# Patient Record
Sex: FEMALE | Race: BLACK OR AFRICAN AMERICAN | NOT HISPANIC OR LATINO | Employment: OTHER | URBAN - METROPOLITAN AREA
[De-identification: names, ages, dates, MRNs, and addresses within clinical notes are randomized per-mention and may not be internally consistent; named-entity substitution may affect disease eponyms.]

---

## 2017-05-12 DIAGNOSIS — E55.9 VITAMIN D DEFICIENCY: ICD-10-CM

## 2017-05-12 RX ORDER — ASPIRIN 325 MG
TABLET, DELAYED RELEASE (ENTERIC COATED) ORAL
Qty: 4 CAPSULE | Refills: 6 | Status: SHIPPED | OUTPATIENT
Start: 2017-05-12

## 2017-06-15 ENCOUNTER — TELEPHONE (OUTPATIENT)
Dept: RHEUMATOLOGY | Facility: CLINIC | Age: 82
End: 2017-06-15

## 2017-06-15 NOTE — TELEPHONE ENCOUNTER
----- Message from Luis Eduardo Junior sent at 6/15/2017  9:32 AM CDT -----  Contact: PT   States she is calling rg wanting to knw when she is due for a follow up with  and can be reached at [phone]//thanks/dbw   States if no answer pls leave it on the VM

## 2017-06-15 NOTE — TELEPHONE ENCOUNTER
Patient needing a call back regarding Reclast follow up with BMP and Natacha Baumann PA-C. Please advise.

## 2017-06-16 ENCOUNTER — TELEPHONE (OUTPATIENT)
Dept: RHEUMATOLOGY | Facility: HOSPITAL | Age: 82
End: 2017-06-16

## 2017-06-16 DIAGNOSIS — E55.9 VITAMIN D DEFICIENCY: Primary | ICD-10-CM

## 2017-06-16 DIAGNOSIS — E55.9 VITAMIN D DEFICIENCY: ICD-10-CM

## 2017-06-16 DIAGNOSIS — M81.0 AGE-RELATED OSTEOPOROSIS WITHOUT CURRENT PATHOLOGICAL FRACTURE: Primary | ICD-10-CM

## 2017-06-16 DIAGNOSIS — M81.0 AGE-RELATED OSTEOPOROSIS WITHOUT CURRENT PATHOLOGICAL FRACTURE: ICD-10-CM

## 2017-06-16 RX ORDER — ZOLEDRONIC ACID 5 MG/100ML
5 INJECTION, SOLUTION INTRAVENOUS
Status: CANCELLED | OUTPATIENT
Start: 2017-06-29

## 2017-06-16 RX ORDER — SODIUM CHLORIDE 0.9 % (FLUSH) 0.9 %
10 SYRINGE (ML) INJECTION
Status: CANCELLED | OUTPATIENT
Start: 2017-06-29

## 2017-06-16 NOTE — TELEPHONE ENCOUNTER
Please enter new reclast therapy plan so med auth can be released  Dr Loaiza last saw 5/2016  Also please put in appro lab orders stat  brit 6/29 with you   TY

## 2017-06-29 ENCOUNTER — INFUSION (OUTPATIENT)
Dept: RHEUMATOLOGY | Facility: HOSPITAL | Age: 82
End: 2017-06-29
Attending: INTERNAL MEDICINE
Payer: MEDICARE

## 2017-06-29 ENCOUNTER — LAB VISIT (OUTPATIENT)
Dept: LAB | Facility: HOSPITAL | Age: 82
End: 2017-06-29
Attending: INTERNAL MEDICINE
Payer: MEDICARE

## 2017-06-29 ENCOUNTER — TELEPHONE (OUTPATIENT)
Dept: RHEUMATOLOGY | Facility: CLINIC | Age: 82
End: 2017-06-29

## 2017-06-29 ENCOUNTER — OFFICE VISIT (OUTPATIENT)
Dept: RHEUMATOLOGY | Facility: CLINIC | Age: 82
End: 2017-06-29
Attending: INTERNAL MEDICINE
Payer: MEDICARE

## 2017-06-29 VITALS
WEIGHT: 225.31 LBS | BODY MASS INDEX: 35.29 KG/M2 | SYSTOLIC BLOOD PRESSURE: 101 MMHG | RESPIRATION RATE: 18 BRPM | DIASTOLIC BLOOD PRESSURE: 56 MMHG | HEART RATE: 67 BPM

## 2017-06-29 VITALS
DIASTOLIC BLOOD PRESSURE: 65 MMHG | SYSTOLIC BLOOD PRESSURE: 138 MMHG | HEART RATE: 93 BPM | BODY MASS INDEX: 35.29 KG/M2 | WEIGHT: 225.31 LBS

## 2017-06-29 DIAGNOSIS — M81.0 AGE-RELATED OSTEOPOROSIS WITHOUT CURRENT PATHOLOGICAL FRACTURE: Primary | ICD-10-CM

## 2017-06-29 DIAGNOSIS — M17.0 PRIMARY OSTEOARTHRITIS OF BOTH KNEES: Chronic | ICD-10-CM

## 2017-06-29 DIAGNOSIS — M81.0 AGE-RELATED OSTEOPOROSIS WITHOUT CURRENT PATHOLOGICAL FRACTURE: ICD-10-CM

## 2017-06-29 DIAGNOSIS — M85.89 OSTEOPENIA OF MULTIPLE SITES: Primary | ICD-10-CM

## 2017-06-29 DIAGNOSIS — E55.9 VITAMIN D DEFICIENCY: ICD-10-CM

## 2017-06-29 DIAGNOSIS — M85.89 OSTEOPENIA OF MULTIPLE SITES: ICD-10-CM

## 2017-06-29 LAB
25(OH)D3+25(OH)D2 SERPL-MCNC: 71 NG/ML
ANION GAP SERPL CALC-SCNC: 14 MMOL/L
BUN SERPL-MCNC: 29 MG/DL
CALCIUM SERPL-MCNC: 10.3 MG/DL
CHLORIDE SERPL-SCNC: 100 MMOL/L
CO2 SERPL-SCNC: 28 MMOL/L
CREAT SERPL-MCNC: 1.5 MG/DL
EST. GFR  (AFRICAN AMERICAN): 37 ML/MIN/1.73 M^2
EST. GFR  (NON AFRICAN AMERICAN): 32 ML/MIN/1.73 M^2
GLUCOSE SERPL-MCNC: 111 MG/DL
POTASSIUM SERPL-SCNC: 3.5 MMOL/L
SODIUM SERPL-SCNC: 142 MMOL/L

## 2017-06-29 PROCEDURE — 25000003 PHARM REV CODE 250: Mod: PO | Performed by: PHYSICIAN ASSISTANT

## 2017-06-29 PROCEDURE — 63600175 PHARM REV CODE 636 W HCPCS: Mod: PO | Performed by: PHYSICIAN ASSISTANT

## 2017-06-29 PROCEDURE — 99213 OFFICE O/P EST LOW 20 MIN: CPT | Mod: PBBFAC,PO | Performed by: PHYSICIAN ASSISTANT

## 2017-06-29 PROCEDURE — 36415 COLL VENOUS BLD VENIPUNCTURE: CPT | Mod: PO

## 2017-06-29 PROCEDURE — 96360 HYDRATION IV INFUSION INIT: CPT | Mod: PO,59

## 2017-06-29 PROCEDURE — 99214 OFFICE O/P EST MOD 30 MIN: CPT | Mod: S$PBB,,, | Performed by: PHYSICIAN ASSISTANT

## 2017-06-29 PROCEDURE — 1159F MED LIST DOCD IN RCRD: CPT | Mod: ,,, | Performed by: PHYSICIAN ASSISTANT

## 2017-06-29 PROCEDURE — 82306 VITAMIN D 25 HYDROXY: CPT

## 2017-06-29 PROCEDURE — 80048 BASIC METABOLIC PNL TOTAL CA: CPT | Mod: PO

## 2017-06-29 PROCEDURE — 99999 PR PBB SHADOW E&M-EST. PATIENT-LVL III: CPT | Mod: PBBFAC,,, | Performed by: PHYSICIAN ASSISTANT

## 2017-06-29 PROCEDURE — 96365 THER/PROPH/DIAG IV INF INIT: CPT | Mod: PO

## 2017-06-29 PROCEDURE — 1125F AMNT PAIN NOTED PAIN PRSNT: CPT | Mod: ,,, | Performed by: PHYSICIAN ASSISTANT

## 2017-06-29 RX ORDER — SODIUM CHLORIDE 9 MG/ML
INJECTION, SOLUTION INTRAVENOUS CONTINUOUS
Status: CANCELLED
Start: 2017-06-29

## 2017-06-29 RX ORDER — SODIUM CHLORIDE 9 MG/ML
INJECTION, SOLUTION INTRAVENOUS CONTINUOUS
Status: DISCONTINUED | OUTPATIENT
Start: 2017-06-29 | End: 2017-06-29 | Stop reason: HOSPADM

## 2017-06-29 RX ORDER — ZOLEDRONIC ACID 5 MG/100ML
5 INJECTION, SOLUTION INTRAVENOUS
Status: CANCELLED | OUTPATIENT
Start: 2017-06-29

## 2017-06-29 RX ORDER — SODIUM CHLORIDE 0.9 % (FLUSH) 0.9 %
10 SYRINGE (ML) INJECTION
Status: CANCELLED | OUTPATIENT
Start: 2017-06-29

## 2017-06-29 RX ORDER — SPIRONOLACTONE 25 MG/1
25 TABLET ORAL DAILY
COMMUNITY

## 2017-06-29 RX ORDER — SODIUM CHLORIDE 0.9 % (FLUSH) 0.9 %
10 SYRINGE (ML) INJECTION
Status: DISCONTINUED | OUTPATIENT
Start: 2017-06-29 | End: 2017-06-29 | Stop reason: HOSPADM

## 2017-06-29 RX ORDER — ZOLEDRONIC ACID 5 MG/100ML
5 INJECTION, SOLUTION INTRAVENOUS
Status: COMPLETED | OUTPATIENT
Start: 2017-06-29 | End: 2017-06-29

## 2017-06-29 RX ORDER — IBUPROFEN 200 MG
1 CAPSULE ORAL DAILY
COMMUNITY
End: 2017-12-29 | Stop reason: SDUPTHER

## 2017-06-29 RX ADMIN — Medication 10 ML: at 08:06

## 2017-06-29 RX ADMIN — SODIUM CHLORIDE: 9 INJECTION, SOLUTION INTRAVENOUS at 08:06

## 2017-06-29 RX ADMIN — ZOLEDRONIC ACID 5 MG: 5 INJECTION, SOLUTION INTRAVENOUS at 09:06

## 2017-06-29 NOTE — PATIENT INSTRUCTIONS
Zoledronic Acid injection (Paget's Disease, Osteoporosis)  What is this medicine?  ZOLEDRONIC ACID (PABLO le dron ik AS id) lowers the amount of calcium loss from bone. It is used to treat Paget's disease and osteoporosis in women.  How should I use this medicine?  This medicine is for infusion into a vein. It is given by a health care professional in a hospital or clinic setting.  Talk to your pediatrician regarding the use of this medicine in children. This medicine is not approved for use in children.  What side effects may I notice from receiving this medicine?  Side effects that you should report to your doctor or health care professional as soon as possible:  · allergic reactions like skin rash, itching or hives, swelling of the face, lips, or tongue  · anxiety, confusion, or depression  · breathing problems  · changes in vision  · eye pain  · feeling faint or lightheaded, falls  · jaw pain, especially after dental work  · mouth sores  · muscle cramps, stiffness, or weakness  · redness, blistering, peeling or loosening of the skin, including inside the mouth  · trouble passing urine or change in the amount of urine  Side effects that usually do not require medical attention (report to your doctor or health care professional if they continue or are bothersome):  · bone, joint, or muscle pain  · constipation  · diarrhea  · fever  · hair loss  · irritation at site where injected  · loss of appetite  · nausea, vomiting  · stomach upset  · trouble sleeping  · trouble swallowing  · weak or tired  What may interact with this medicine?  · certain antibiotics given by injection  · NSAIDs, medicines for pain and inflammation, like ibuprofen or naproxen  · some diuretics like bumetanide, furosemide  · teriparatide  What if I miss a dose?  It is important not to miss your dose. Call your doctor or health care professional if you are unable to keep an appointment.  Where should I keep my medicine?  This drug is given in a  hospital or clinic and will not be stored at home.  What should I tell my health care provider before I take this medicine?  They need to know if you have any of these conditions:  · aspirin-sensitive asthma  · cancer, especially if you are receiving medicines used to treat cancer  · dental disease or wear dentures  · infection  · kidney disease  · low levels of calcium in the blood  · past surgery on the parathyroid gland or intestines  · receiving corticosteroids like dexamethasone or prednisone  · an unusual or allergic reaction to zoledronic acid, other medicines, foods, dyes, or preservatives  · pregnant or trying to get pregnant  · breast-feeding  What should I watch for while using this medicine?  Visit your doctor or health care professional for regular checkups. It may be some time before you see the benefit from this medicine. Do not stop taking your medicine unless your doctor tells you to. Your doctor may order blood tests or other tests to see how you are doing.  Women should inform their doctor if they wish to become pregnant or think they might be pregnant. There is a potential for serious side effects to an unborn child. Talk to your health care professional or pharmacist for more information.  You should make sure that you get enough calcium and vitamin D while you are taking this medicine. Discuss the foods you eat and the vitamins you take with your health care professional.  Some people who take this medicine have severe bone, joint, and/or muscle pain. This medicine may also increase your risk for jaw problems or a broken thigh bone. Tell your doctor right away if you have severe pain in your jaw, bones, joints, or muscles. Tell your doctor if you have any pain that does not go away or that gets worse.  Tell your dentist and dental surgeon that you are taking this medicine. You should not have major dental surgery while on this medicine. See your dentist to have a dental exam and fix any dental  problems before starting this medicine. Take good care of your teeth while on this medicine. Make sure you see your dentist for regular follow-up appointments.  Date Last Reviewed:   NOTE:This sheet is a summary. It may not cover all possible information. If you have questions about this medicine, talk to your doctor, pharmacist, or health care provider. Copyright© 2016 [employer]

## 2017-06-29 NOTE — PATIENT INSTRUCTIONS
Recent Results (from the past 168 hour(s))   Basic metabolic panel    Collection Time: 06/29/17  7:20 AM   Result Value Ref Range    Sodium 142 136 - 145 mmol/L    Potassium 3.5 3.5 - 5.1 mmol/L    Chloride 100 95 - 110 mmol/L    CO2 28 23 - 29 mmol/L    Glucose 111 (H) 70 - 110 mg/dL    BUN, Bld 29 (H) 8 - 23 mg/dL    Creatinine 1.5 (H) 0.5 - 1.4 mg/dL    Calcium 10.3 8.7 - 10.5 mg/dL    Anion Gap 14 8 - 16 mmol/L    eGFR if African American 37 (A) >60 mL/min/1.73 m^2    eGFR if non African American 32 (A) >60 mL/min/1.73 m^2     reclast today, may be moving to another drug next June/july (PROLIA)  Drink lots of water  See your primary care md about your kidneys, may need to adjust some medications  Hold your diuretics today

## 2017-06-29 NOTE — PROGRESS NOTES
Infusion # 2  Recent Labs? 6/29/17-Creatinine-1.5. Pt cleared for infusion per FRANCIE Manzano.     Recent Invasive or planned dental procedures? Denied    100 ml NS administered over 10 minutes per FRANCIE Manzano.   Reclast 5mg administered IV over 30 minutes.     Pt tolerated well without adverse events. Pressure dressing applied.  See Vitals and MAR for further details.  Pt discharged ambulatory from clinic.

## 2017-06-29 NOTE — PROGRESS NOTES
Subjective:       Patient ID: Shweta Welch is a 82 y.o. female.    Chief Complaint: Osteoporosis    Shweta is here for follow up for osteopenia with high fracture risk and osteoarthritis.  She is here today for reclast #2. Tolerated her first infusion well. She has low vitamin D and takes 50K units weekly.  NO falls or fxs since last visit, she walks with a cane.   She has severe arthritis in both knees and has had her right knee replaced.  Her left knee has been injected with cortisone in the past but this didn't really help.  She has severe tricompartmental damage on xray. She also c/o low back pain with occasional numbness/tingling in her feet.  SHe is unable to take nsaids due to kidney insufficiency.  She uses tylenol prn.       Review of Systems   Constitutional: Negative for chills, fatigue and fever.   HENT: Negative for mouth sores, rhinorrhea and sore throat.    Eyes: Negative for pain and redness.   Respiratory: Negative for cough and shortness of breath.    Cardiovascular: Negative for chest pain.        Heart failure  htn   Gastrointestinal: Negative for abdominal pain, constipation, diarrhea, nausea and vomiting.   Genitourinary: Negative for dysuria and hematuria.   Musculoskeletal: Positive for arthralgias, back pain and gait problem. Negative for joint swelling and myalgias.   Skin: Negative for rash.   Neurological: Negative for weakness, numbness and headaches.   Psychiatric/Behavioral: The patient is not nervous/anxious.          Objective:   /65   Pulse 93   Wt 102.2 kg (225 lb 5 oz)   BMI 35.29 kg/m²      Physical Exam   Constitutional: She is oriented to person, place, and time and well-developed, well-nourished, and in no distress.   HENT:   Head: Normocephalic and atraumatic.   Eyes: Pupils are equal, round, and reactive to light. Right eye exhibits no discharge.   Neck: Normal range of motion.   Cardiovascular: Normal rate, regular rhythm and normal heart sounds.  Exam reveals  no friction rub.    Pulmonary/Chest: Effort normal and breath sounds normal. No respiratory distress.   Abdominal: Soft. She exhibits no distension. There is no tenderness.   Lymphadenopathy:     She has no cervical adenopathy.   Neurological: She is alert and oriented to person, place, and time.   Skin: No rash noted. No erythema.     Psychiatric: Mood normal.   Musculoskeletal: Normal range of motion. She exhibits no edema or deformity.   Left knee with crepitus, significant, no effusion, no warmth, decreased rom.  Right knee replaced.  She walks with a cane and needs to push up when getting out of her chair               Recent Results (from the past 168 hour(s))   Basic metabolic panel    Collection Time: 06/29/17  7:20 AM   Result Value Ref Range    Sodium 142 136 - 145 mmol/L    Potassium 3.5 3.5 - 5.1 mmol/L    Chloride 100 95 - 110 mmol/L    CO2 28 23 - 29 mmol/L    Glucose 111 (H) 70 - 110 mg/dL    BUN, Bld 29 (H) 8 - 23 mg/dL    Creatinine 1.5 (H) 0.5 - 1.4 mg/dL    Calcium 10.3 8.7 - 10.5 mg/dL    Anion Gap 14 8 - 16 mmol/L    eGFR if African American 37 (A) >60 mL/min/1.73 m^2    eGFR if non African American 32 (A) >60 mL/min/1.73 m^2          Dexa 5.2016: DF -0.8, left neck -2.4, spine -1.5, frax major 34.2, hip 11.7 decreasing bmd at hip  Assessment:       1. Osteopenia of multiple sites    2. Primary osteoarthritis of both knees    3. Vitamin D deficiency        Osteopenia with high fracture risk: Reclast  #2 due today,Dexa 5.2016: DF -0.8, left neck -2.4, spine -1.5, frax major 34.2, hip 11.7 decreasing bmd at hip; no falls/fxs  Vitamin D deficiency: on 50K units weekly   Bilateral knee osteoarthritis: right knee replaced; severe left knee djd cortisone don't work very well, avoids nsaids, ultimately needs knee replacement  CKD: GFR 37, could be worsened from htn meds  Plan:       Will give reclast today but if GFR gets below 35 will not continue, will discuss with Dr. Loaiza, may be better to move  to [place] next June when due for reclast #3  Will give 100ccs of Normal saline and give reclast over 30 mins  F/u with pcp about kidney function, may need nephrology referral   Cont vit D 50,000units weekly   Hold lasix today, hydrate well today     rtc in 6 mos, will check kidney function and discuss continuing reclast vs moving to [place]

## 2017-06-29 NOTE — PLAN OF CARE
Problem: Fall Risk (Adult)  Goal: Absence of Falls  Patient will demonstrate the desired outcomes by discharge/transition of care.  Outcome: Ongoing (interventions implemented as appropriate)  Reviewed fall precautions with pt. Verbalizes understanding.

## 2017-07-24 ENCOUNTER — TELEPHONE (OUTPATIENT)
Dept: RHEUMATOLOGY | Facility: CLINIC | Age: 82
End: 2017-07-24

## 2017-07-24 DIAGNOSIS — M85.89 OSTEOPENIA OF MULTIPLE SITES: ICD-10-CM

## 2017-07-24 DIAGNOSIS — M17.0 PRIMARY OSTEOARTHRITIS OF BOTH KNEES: Primary | Chronic | ICD-10-CM

## 2017-07-24 NOTE — TELEPHONE ENCOUNTER
----- Message from Cammy De Luna sent at 7/24/2017  2:11 PM CDT -----  Contact: pt  She's calling to see if she should come in due to a fall, please advise [phone] (home)

## 2017-07-24 NOTE — TELEPHONE ENCOUNTER
Returned pt call pt stated on her last visit when she got home she tripped and fell on both knees. She stating she walks ok, but her knees are still sensitive and hurts from time to time. She states it feels like where she had surgery on her knee (2001)? That something feels out of place. Pt wants to know shuld she get xrays?,make an appt? Etc.... Please advise thanks

## 2017-07-26 ENCOUNTER — HOSPITAL ENCOUNTER (OUTPATIENT)
Dept: RADIOLOGY | Facility: HOSPITAL | Age: 82
Discharge: HOME | End: 2017-07-26
Attending: INTERNAL MEDICINE
Payer: MEDICARE

## 2017-07-26 DIAGNOSIS — M85.89 OSTEOPENIA OF MULTIPLE SITES: ICD-10-CM

## 2017-07-26 DIAGNOSIS — M17.0 PRIMARY OSTEOARTHRITIS OF BOTH KNEES: Chronic | ICD-10-CM

## 2017-07-26 PROCEDURE — 73562 X-RAY EXAM OF KNEE 3: CPT | Mod: TC,50,PO

## 2017-07-26 PROCEDURE — 73562 X-RAY EXAM OF KNEE 3: CPT | Mod: 26,50,, | Performed by: RADIOLOGY

## 2017-08-03 ENCOUNTER — OFFICE VISIT (OUTPATIENT)
Dept: RHEUMATOLOGY | Facility: CLINIC | Age: 82
End: 2017-08-03
Payer: MEDICARE

## 2017-08-03 VITALS
HEART RATE: 97 BPM | SYSTOLIC BLOOD PRESSURE: 132 MMHG | WEIGHT: 222.88 LBS | DIASTOLIC BLOOD PRESSURE: 64 MMHG | BODY MASS INDEX: 34.91 KG/M2

## 2017-08-03 DIAGNOSIS — M25.562 ACUTE PAIN OF BOTH KNEES: Primary | ICD-10-CM

## 2017-08-03 DIAGNOSIS — M25.561 ACUTE PAIN OF BOTH KNEES: Primary | ICD-10-CM

## 2017-08-03 DIAGNOSIS — M17.0 PRIMARY OSTEOARTHRITIS OF BOTH KNEES: Chronic | ICD-10-CM

## 2017-08-03 DIAGNOSIS — M47.816 SPONDYLOSIS OF LUMBAR REGION WITHOUT MYELOPATHY OR RADICULOPATHY: Chronic | ICD-10-CM

## 2017-08-03 DIAGNOSIS — M85.89 OSTEOPENIA OF MULTIPLE SITES: ICD-10-CM

## 2017-08-03 PROCEDURE — 99214 OFFICE O/P EST MOD 30 MIN: CPT | Mod: S$PBB,,, | Performed by: PHYSICIAN ASSISTANT

## 2017-08-03 PROCEDURE — 1125F AMNT PAIN NOTED PAIN PRSNT: CPT | Mod: ,,, | Performed by: PHYSICIAN ASSISTANT

## 2017-08-03 PROCEDURE — 1159F MED LIST DOCD IN RCRD: CPT | Mod: ,,, | Performed by: PHYSICIAN ASSISTANT

## 2017-08-03 PROCEDURE — 3008F BODY MASS INDEX DOCD: CPT | Mod: ,,, | Performed by: PHYSICIAN ASSISTANT

## 2017-08-03 PROCEDURE — 99213 OFFICE O/P EST LOW 20 MIN: CPT | Mod: PBBFAC,PO | Performed by: PHYSICIAN ASSISTANT

## 2017-08-03 PROCEDURE — 99999 PR PBB SHADOW E&M-EST. PATIENT-LVL III: CPT | Mod: PBBFAC,,, | Performed by: PHYSICIAN ASSISTANT

## 2017-08-03 RX ORDER — GABAPENTIN 300 MG/1
300 CAPSULE ORAL 3 TIMES DAILY
Qty: 90 CAPSULE | Refills: 11 | Status: SHIPPED | OUTPATIENT
Start: 2017-08-03 | End: 2017-12-29

## 2017-08-03 NOTE — PATIENT INSTRUCTIONS
aspercream with lidocaine     Gabapentin 300mg start at night, can take up to three times a day, can also increase the night time dose to 600mg, this is for back radiating pain    Tumeric 1000mg/day for antiinflammation over the counter

## 2017-08-03 NOTE — PROGRESS NOTES
Subjective:       Patient ID: Shweta Welch is a 82 y.o. female.    Chief Complaint: Osteoarthritis    Shweta is usually seen here for osteopenia with high fracture risk.  She is on Reclast and has had 2 doses.  Of note she does have chronic kidney disease with her last GFR at 37 and we're considering moving to [facility].  She also has severe degenerative joint disease in her left knee.  She has had her right knee replaced for DJD.      About a month ago she tripped at home and fell onto her knees.  She was worried that she may have displaced some of the hardware in her right knee.  She had pain to both knees.  She has been taking Tylenol.  She has an NSAID ALLERGY and also CK D prevents NSAID use.  Today she still has pain in her bilateral knees however it is somewhat improved from a month ago.  She has tenderness to both of her knees.  She has mild swelling to her left knee.  X-rays were obtained showing no fracture, hardware in the right knee is not displaced.  She is here today to go over her x-rays and for evaluation after her fall.      Osteoarthritis   Associated symptoms include arthralgias. Pertinent negatives include no abdominal pain, chest pain, chills, coughing, fatigue, fever, headaches, joint swelling, myalgias, nausea, numbness, rash, sore throat, vomiting or weakness.     Review of Systems   Constitutional: Negative for chills, fatigue and fever.   HENT: Negative for mouth sores, rhinorrhea and sore throat.    Eyes: Negative for pain and redness.   Respiratory: Negative for cough and shortness of breath.    Cardiovascular: Negative for chest pain.        Heart failure  htn   Gastrointestinal: Negative for abdominal pain, constipation, diarrhea, nausea and vomiting.   Genitourinary: Negative for dysuria and hematuria.   Musculoskeletal: Positive for arthralgias, back pain and gait problem. Negative for joint swelling and myalgias.   Skin: Negative for rash.   Neurological: Negative for weakness,  numbness and headaches.   Psychiatric/Behavioral: The patient is not nervous/anxious.          Objective:   /64   Pulse 97   Wt 101.1 kg (222 lb 14.2 oz)   BMI 34.91 kg/m²      Physical Exam   Constitutional: She is oriented to person, place, and time and well-developed, well-nourished, and in no distress.   HENT:   Head: Normocephalic and atraumatic.   Eyes: Pupils are equal, round, and reactive to light. Right eye exhibits no discharge.   Neck: Normal range of motion.   Cardiovascular: Normal rate, regular rhythm and normal heart sounds.  Exam reveals no friction rub.    Pulmonary/Chest: Effort normal and breath sounds normal. No respiratory distress.   Abdominal: Soft. She exhibits no distension. There is no tenderness.   Lymphadenopathy:     She has no cervical adenopathy.   Neurological: She is alert and oriented to person, place, and time.   Skin: No rash noted. No erythema.     Psychiatric: Mood normal.   Musculoskeletal: Normal range of motion. She exhibits no edema or deformity.   Left knee with crepitus, significant, mild effusion, no warmth, decreased rom.    Right knee replaced. No effusion, no warmth    clovis knees tender to palpation diffusely but mostly anterior      She walks with a cane and needs to push up when getting out of her chair               No results found for this or any previous visit (from the past 168 hour(s)).       Dexa 5.2016: DF -0.8, left neck -2.4, spine -1.5, frax major 34.2, hip 11.7 decreasing bmd at hip  Assessment:       1. Acute pain of both knees    2. Primary osteoarthritis of both knees    3. Spondylosis of lumbar region without myelopathy or radiculopathy    4. Osteopenia of multiple sites        Impression:    Clovis knee pain s/p fall: Xrays with no fx, right knee hardware in good position, likely just bruised bone, soft tissue injury, unable to take nsaids  Bilateral knee osteoarthritis: right knee replaced; severe left knee djd cortisones don't work very well,  avoids nsaids, ultimately needs knee replacement  Lumbar spondylosis: with radiation into her left leg and occ numbness/tingling  Osteopenia with high fracture risk: Reclast  x2 doses last done 6/2017 ,Dexa 5.2016: DF -0.8, left neck -2.4, spine -1.5, frax major 34.2, hip 11.7 decreasing bmd at hip; no fxs  CKD: GFR 37, could be worsened from htn meds, considering moving to [place]  Plan:       Recommend otc topicals for knee (allergic to nsaids so don't even want to do topical nsaids), cont tylenol, rest, ice  If no improvement would recommend f/u with ortho since TKA on the right  Gabapentin for radiating back pain 300mg q hs can increase as terry to tid     rtc in December  Considering moving to [place], , next reclast due 6/2018, new dexa due 5/2018

## 2017-08-10 ENCOUNTER — TELEPHONE (OUTPATIENT)
Dept: RHEUMATOLOGY | Facility: CLINIC | Age: 82
End: 2017-08-10

## 2017-08-10 NOTE — TELEPHONE ENCOUNTER
Called pt and let her know her handicap letter was ready for her to be picked up, pt verbalized understanding.

## 2017-08-10 NOTE — TELEPHONE ENCOUNTER
----- Message from Shaggy Smith sent at 8/10/2017 11:56 AM CDT -----  Contact: Pt   Pt is requesting to speak with the nurse in regards to her handicap paperwork. Please advise [phone] (home)

## 2017-09-20 DIAGNOSIS — M47.816 SPONDYLOSIS OF LUMBAR REGION WITHOUT MYELOPATHY OR RADICULOPATHY: Chronic | ICD-10-CM

## 2017-09-20 DIAGNOSIS — M17.0 PRIMARY OSTEOARTHRITIS OF BOTH KNEES: Chronic | ICD-10-CM

## 2017-09-25 DIAGNOSIS — M17.0 PRIMARY OSTEOARTHRITIS OF BOTH KNEES: Chronic | ICD-10-CM

## 2017-09-25 DIAGNOSIS — M47.816 SPONDYLOSIS OF LUMBAR REGION WITHOUT MYELOPATHY OR RADICULOPATHY: Chronic | ICD-10-CM

## 2017-09-25 RX ORDER — TRAMADOL HYDROCHLORIDE 50 MG/1
TABLET ORAL
Qty: 90 TABLET | Refills: 3 | Status: SHIPPED | OUTPATIENT
Start: 2017-09-25 | End: 2017-12-29

## 2017-09-25 RX ORDER — TRAMADOL HYDROCHLORIDE 50 MG/1
TABLET ORAL
Qty: 90 TABLET | Refills: 3 | OUTPATIENT
Start: 2017-09-25

## 2017-09-25 NOTE — TELEPHONE ENCOUNTER
----- Message from Crystal De Luna sent at 9/25/2017  3:22 PM CDT -----  Call pt at [phone]//pt calling for a refill for Tramadol please call to [facility] on [address] //ks ht

## 2017-12-29 ENCOUNTER — LAB VISIT (OUTPATIENT)
Dept: LAB | Facility: HOSPITAL | Age: 82
End: 2017-12-29
Attending: PHYSICIAN ASSISTANT
Payer: MEDICARE

## 2017-12-29 ENCOUNTER — TELEPHONE (OUTPATIENT)
Dept: RHEUMATOLOGY | Facility: CLINIC | Age: 82
End: 2017-12-29

## 2017-12-29 ENCOUNTER — OFFICE VISIT (OUTPATIENT)
Dept: RHEUMATOLOGY | Facility: CLINIC | Age: 82
End: 2017-12-29
Payer: MEDICARE

## 2017-12-29 VITALS
WEIGHT: 228.81 LBS | HEIGHT: 67 IN | BODY MASS INDEX: 35.91 KG/M2 | SYSTOLIC BLOOD PRESSURE: 146 MMHG | DIASTOLIC BLOOD PRESSURE: 66 MMHG | HEART RATE: 108 BPM

## 2017-12-29 DIAGNOSIS — N18.30 STAGE 3 CHRONIC KIDNEY DISEASE: ICD-10-CM

## 2017-12-29 DIAGNOSIS — M47.816 SPONDYLOSIS OF LUMBAR REGION WITHOUT MYELOPATHY OR RADICULOPATHY: ICD-10-CM

## 2017-12-29 DIAGNOSIS — M85.89 OSTEOPENIA OF MULTIPLE SITES: Primary | ICD-10-CM

## 2017-12-29 DIAGNOSIS — M89.9 DISORDER OF BONE AND ARTICULAR CARTILAGE: ICD-10-CM

## 2017-12-29 DIAGNOSIS — M17.0 PRIMARY OSTEOARTHRITIS OF BOTH KNEES: ICD-10-CM

## 2017-12-29 DIAGNOSIS — M94.9 DISORDER OF BONE AND ARTICULAR CARTILAGE: ICD-10-CM

## 2017-12-29 DIAGNOSIS — M81.0 AGE-RELATED OSTEOPOROSIS WITHOUT CURRENT PATHOLOGICAL FRACTURE: ICD-10-CM

## 2017-12-29 LAB
ANION GAP SERPL CALC-SCNC: 9 MMOL/L
BUN SERPL-MCNC: 33 MG/DL
CALCIUM SERPL-MCNC: 10.3 MG/DL
CHLORIDE SERPL-SCNC: 95 MMOL/L
CO2 SERPL-SCNC: 36 MMOL/L
CREAT SERPL-MCNC: 1.5 MG/DL
EST. GFR  (AFRICAN AMERICAN): 37 ML/MIN/1.73 M^2
EST. GFR  (NON AFRICAN AMERICAN): 32 ML/MIN/1.73 M^2
GLUCOSE SERPL-MCNC: 91 MG/DL
POTASSIUM SERPL-SCNC: 3.3 MMOL/L
SODIUM SERPL-SCNC: 140 MMOL/L

## 2017-12-29 PROCEDURE — 99999 PR PBB SHADOW E&M-EST. PATIENT-LVL V: CPT | Mod: PBBFAC,,, | Performed by: PHYSICIAN ASSISTANT

## 2017-12-29 PROCEDURE — 99215 OFFICE O/P EST HI 40 MIN: CPT | Mod: PBBFAC,PO | Performed by: PHYSICIAN ASSISTANT

## 2017-12-29 PROCEDURE — 99214 OFFICE O/P EST MOD 30 MIN: CPT | Mod: S$PBB,,, | Performed by: PHYSICIAN ASSISTANT

## 2017-12-29 PROCEDURE — 36415 COLL VENOUS BLD VENIPUNCTURE: CPT | Mod: PO

## 2017-12-29 PROCEDURE — 80048 BASIC METABOLIC PNL TOTAL CA: CPT | Mod: PO

## 2017-12-29 RX ORDER — METHOCARBAMOL 500 MG/1
500 TABLET, FILM COATED ORAL 3 TIMES DAILY
Qty: 90 TABLET | Refills: 2 | Status: SHIPPED | OUTPATIENT
Start: 2017-12-29 | End: 2018-01-08

## 2017-12-29 NOTE — TELEPHONE ENCOUNTER
----- Message from Grant Reno LPN sent at 12/29/2017  3:02 PM CST -----  Grant, Let her know her lab is back, her kidney function is the same as last time, still low.  We will plan to not continue reclast and start [facility] next visit.  Also she needs to make sure her primary care doctor is aware and watching her kidney function, some of her blood pressure medications may need to be adjusted

## 2017-12-29 NOTE — PROGRESS NOTES
"Subjective:       Patient ID: Shweta Welch is a 82 y.o. female.    Chief Complaint: Osteoarthritis    Shweta is here for osteopenia with high fracture risk.  She is on Reclast and has had 2 doses.  Of note she does have chronic kidney disease with her last GFR at 37 and we're considering moving to [facility].  She is due for her reclast next June, last done 6/2017.  She also has severe degenerative joint disease in her left knee.  She has had her right knee replaced for DJD.  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  She used gabapentin but this caused side effects.  She has lumbar degenerative disease [facility]  Today she is complaining of pain in her neck with tenderness and pain that radiates into her posterior scalp worse on the right .           Osteoarthritis   Associated symptoms include arthralgias, neck pain and numbness. Pertinent negatives include no abdominal pain, chest pain, chills, coughing, fatigue, fever, headaches, joint swelling, myalgias, nausea, rash, sore throat, vomiting or weakness.     Review of Systems   Constitutional: Negative for chills, fatigue and fever.   HENT: Negative for mouth sores, rhinorrhea and sore throat.    Eyes: Negative for pain and redness.   Respiratory: Negative for cough and shortness of breath.    Cardiovascular: Negative for chest pain.        Heart failure  htn   Gastrointestinal: Negative for abdominal pain, constipation, diarrhea, nausea and vomiting.   Genitourinary: Negative for dysuria and hematuria.   Musculoskeletal: Positive for arthralgias, back pain, gait problem and neck pain. Negative for joint swelling and myalgias.   Skin: Negative for rash.   Neurological: Positive for numbness. Negative for weakness and headaches.   Psychiatric/Behavioral: The patient is not nervous/anxious.          Objective:   BP (!) 146/66   Pulse 108   Ht 5' 7" (1.702 m)   Wt 103.8 kg (228 lb 13.4 oz)   BMI 35.84 kg/m²    "   Physical Exam   Constitutional: She is oriented to person, place, and time and well-developed, well-nourished, and in no distress.   HENT:   Head: Normocephalic and atraumatic.   Eyes: Pupils are equal, round, and reactive to light. Right eye exhibits no discharge.   Neck: Normal range of motion.   Cardiovascular: Normal rate, regular rhythm and normal heart sounds.  Exam reveals no friction rub.    Pulmonary/Chest: Effort normal and breath sounds normal. No respiratory distress.   Abdominal: Soft. She exhibits no distension. There is no tenderness.   Lymphadenopathy:     She has no cervical adenopathy.   Neurological: She is alert and oriented to person, place, and time.   Skin: No rash noted. No erythema.     Psychiatric: Mood normal.   Musculoskeletal: Normal range of motion. She exhibits no edema or deformity.   OA changes to stacy hands, mild, no synovitis    Left knee with crepitus, significant, no effusion, no warmth, decreased rom.    Right knee replaced. No effusion, no warmth    Cervical paraspinals +ttp, +spasm worse on right      She walks with a cane and needs to push up when getting out of her chair               No results found for this or any previous visit (from the past 168 hour(s)).       Dexa 5.2016: DF -0.8, left neck -2.4, spine -1.5, frax major 34.2, hip 11.7 decreasing bmd at hip  Assessment:       1. Osteopenia of multiple sites    2. Spondylosis of lumbar region without myelopathy or radiculopathy    3. Primary osteoarthritis of both knees    4. Disorder of bone and articular cartilage    5. Stage 3 chronic kidney disease        Impression:  Osteopenia with high fracture risk: Reclast  x2 doses last done 6/2017, no falls/fxs, takes vit D3 daily 1,000, worsening kidney function, worry about using reclast    Bilateral knee osteoarthritis: right knee replaced; severe left knee djd cortisones don't work very well, avoids nsaids, ultimately needs knee replacement    Lumbar spondylosis: with  radiation into her left leg and occ numbness/tingling, [facility], stopped neurontin due to SE    Cervical muscle spasm: new problem, worse on right side    CKD: GFR 37 last visit, today's pending,  considering moving to [place]  Plan:       Repeat dexa in 6 mos, move to [place] at that time if needed given CKD, [place] is the better option  Try robaxin 500mg at night for muscle spasm, use moist heat as well  Consider PT for neck  Cont with Dr. Arias for back issues and neck issues  auth for [facility] in in case we start next visit    rtc in 6 mos with dexa, bmp, poss [facility]

## 2017-12-29 NOTE — TELEPHONE ENCOUNTER
Grant, Let her know her lab is back, her kidney function is the same as last time, still low.  We will plan to not continue reclast and start [place] next visit.  Also she needs to make sure her primary care doctor is aware and watching her kidney function, some of her blood pressure medications may need to be adjusted

## 2017-12-29 NOTE — TELEPHONE ENCOUNTER
----- Message from Aye Bailey sent at 12/29/2017  3:36 PM CST -----  Contact: pt  States she's returning a call regarding her lab results. Please call pt at [phone]. Thank you

## 2018-06-26 NOTE — PROGRESS NOTES
Subjective:       Patient ID: Shweta Welch is a 83 y.o. female.    Chief Complaint: osteopenia, OA    Shweta is here for osteopenia with high fracture risk.  She is on Reclast and has had 2 doses.  Of note she does have chronic kidney disease with her gfr at 34, and we're considering moving to [place] if needed.  She is due for her reclast now.  She also has severe degenerative joint disease in her left knee. Has had steroid and viscous injections in her knee in the past.  She has had her right knee replaced for DJD.  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  She used gabapentin but this caused side effects.  She has lumbar degenerative disease and [facility]  Given robaxin last visit for muscle spasms not very helpful.     She had a repeat dexa today showing improved bone density. Taking vit D daily 1000units.  No falls/fxs She c/o low back and r hip pain and left knee pain today 8/10.       Osteoarthritis   Associated symptoms include arthralgias, neck pain and numbness. Pertinent negatives include no abdominal pain, chest pain, chills, coughing, fatigue, fever, headaches, joint swelling, myalgias, nausea, rash, sore throat, vomiting or weakness.     Review of Systems   Constitutional: Negative for chills, fatigue and fever.   HENT: Negative for mouth sores, rhinorrhea and sore throat.    Eyes: Negative for pain and redness.   Respiratory: Negative for cough and shortness of breath.    Cardiovascular: Negative for chest pain.        Heart failure  htn   Gastrointestinal: Negative for abdominal pain, constipation, diarrhea, nausea and vomiting.   Genitourinary: Negative for dysuria and hematuria.   Musculoskeletal: Positive for arthralgias, back pain, gait problem and neck pain. Negative for joint swelling and myalgias.   Skin: Negative for rash.   Neurological: Positive for numbness. Negative for weakness and headaches.   Psychiatric/Behavioral: The patient is not  nervous/anxious.          Objective:   BP (!) 152/69   Pulse 82   Wt 101.9 kg (224 lb 10.4 oz)   BMI 35.18 kg/m²      Physical Exam   Constitutional: She is oriented to person, place, and time and well-developed, well-nourished, and in no distress.   HENT:   Head: Normocephalic and atraumatic.   Eyes: Pupils are equal, round, and reactive to light. Right eye exhibits no discharge.   Neck: Normal range of motion.   Cardiovascular: Normal rate, regular rhythm and normal heart sounds.  Exam reveals no friction rub.    Pulmonary/Chest: Effort normal and breath sounds normal. No respiratory distress.   Abdominal: Soft. She exhibits no distension. There is no tenderness.   Lymphadenopathy:     She has no cervical adenopathy.   Neurological: She is alert and oriented to person, place, and time.   Skin: No rash noted. No erythema.     Psychiatric: Mood normal.   Musculoskeletal: Normal range of motion. She exhibits no edema or deformity.   OA changes to stacy hands, mild, no synovitis    Left knee with crepitus, significant, no effusion, no warmth, decreased rom.    Right knee replaced. No effusion, no warmth    Lumbar paraspinal on the right +ttp  Cervical paraspinals +ttp, right side                 Recent Results (from the past 168 hour(s))   Basic metabolic panel    Collection Time: 06/28/18  8:40 AM   Result Value Ref Range    Sodium 141 136 - 145 mmol/L    Potassium 3.5 3.5 - 5.1 mmol/L    Chloride 99 95 - 110 mmol/L    CO2 28 23 - 29 mmol/L    Glucose 109 70 - 110 mg/dL    BUN, Bld 28 (H) 8 - 23 mg/dL    Creatinine 1.6 (H) 0.5 - 1.4 mg/dL    Calcium 10.3 8.7 - 10.5 mg/dL    Anion Gap 14 8 - 16 mmol/L    eGFR if African American 34 (A) >60 mL/min/1.73 m^2    eGFR if non African American 30 (A) >60 mL/min/1.73 m^2          Dexa 5.2016: DF -0.8, left neck -2.4, spine -1.5, frax major 34.2, hip 11.7 decreasing bmd at hip  Dexa 6.28.18: LN -1.7, spine 0.6, improved bone density, frax major 9%, hip 2.1%  Assessment:        1. Osteopenia of multiple sites    2. Primary osteoarthritis of left knee    3. Stage 3 chronic kidney disease    4. Spondylosis of lumbar region without myelopathy or radiculopathy        Impression:  Osteopenia with high fracture risk: Reclast  x2 doses last done 6/2017, dexa today with improved density, takes vit D3 daily 1,000,    Bilateral knee osteoarthritis, exacerbation of left knee djd today: right knee replaced; severe left knee djd avoids nsaids, ultimately needs knee replacement has had steroid and viscous injections in the past    Lumbar spondylosis: with radiation into her right hip and leg today and occ numbness/tingling, [facility], stopped neurontin due to SE    CKD: GFR 34, would not continue bisphosphonate  Plan:       Will give a holiday from tx for her bone density as she has had sig improvement  Repeat dexa in 2 years 6/2020, if treatment needed would move to [place] not bisphosphonate due to ckd  Follow up w Dr. Arias for back issues and neck issues,  Inject left knee as below      rtc in 6 mos for recheck then maybe see yearly     Procedure note: After verbal consent was obtained.  The left knee was prepared with sterile prep.  The skin was anesthetized with 1% ethyl chloride.  The knee joint was injected with 3 cc of 1% lidocaine to anesthetize the knee.  The joint was then injected with 40mg kenalog and 1ml of 1% lidocaine.  Hemostasis was obtained.  The patient tolerated procedure well with no complications.  discharge and icing instructions given to patient

## 2018-06-28 ENCOUNTER — OFFICE VISIT (OUTPATIENT)
Dept: RHEUMATOLOGY | Facility: CLINIC | Age: 83
End: 2018-06-28
Payer: MEDICARE

## 2018-06-28 ENCOUNTER — LAB VISIT (OUTPATIENT)
Dept: LAB | Facility: HOSPITAL | Age: 83
End: 2018-06-28
Attending: PHYSICIAN ASSISTANT
Payer: MEDICARE

## 2018-06-28 VITALS
HEART RATE: 82 BPM | SYSTOLIC BLOOD PRESSURE: 152 MMHG | DIASTOLIC BLOOD PRESSURE: 69 MMHG | WEIGHT: 224.63 LBS | BODY MASS INDEX: 35.18 KG/M2

## 2018-06-28 DIAGNOSIS — E55.9 VITAMIN D DEFICIENCY: ICD-10-CM

## 2018-06-28 DIAGNOSIS — M17.12 PRIMARY OSTEOARTHRITIS OF LEFT KNEE: ICD-10-CM

## 2018-06-28 DIAGNOSIS — M81.0 AGE-RELATED OSTEOPOROSIS WITHOUT CURRENT PATHOLOGICAL FRACTURE: ICD-10-CM

## 2018-06-28 DIAGNOSIS — M47.816 SPONDYLOSIS OF LUMBAR REGION WITHOUT MYELOPATHY OR RADICULOPATHY: ICD-10-CM

## 2018-06-28 DIAGNOSIS — N18.30 STAGE 3 CHRONIC KIDNEY DISEASE: ICD-10-CM

## 2018-06-28 DIAGNOSIS — M85.89 OSTEOPENIA OF MULTIPLE SITES: Primary | ICD-10-CM

## 2018-06-28 LAB
25(OH)D3+25(OH)D2 SERPL-MCNC: 44 NG/ML
ANION GAP SERPL CALC-SCNC: 14 MMOL/L
BUN SERPL-MCNC: 28 MG/DL
CALCIUM SERPL-MCNC: 10.3 MG/DL
CHLORIDE SERPL-SCNC: 99 MMOL/L
CO2 SERPL-SCNC: 28 MMOL/L
CREAT SERPL-MCNC: 1.6 MG/DL
EST. GFR  (AFRICAN AMERICAN): 34 ML/MIN/1.73 M^2
EST. GFR  (NON AFRICAN AMERICAN): 30 ML/MIN/1.73 M^2
GLUCOSE SERPL-MCNC: 109 MG/DL
POTASSIUM SERPL-SCNC: 3.5 MMOL/L
SODIUM SERPL-SCNC: 141 MMOL/L

## 2018-06-28 PROCEDURE — 36415 COLL VENOUS BLD VENIPUNCTURE: CPT | Mod: PO

## 2018-06-28 PROCEDURE — 99999 PR PBB SHADOW E&M-EST. PATIENT-LVL III: CPT | Mod: PBBFAC,,, | Performed by: PHYSICIAN ASSISTANT

## 2018-06-28 PROCEDURE — 82306 VITAMIN D 25 HYDROXY: CPT

## 2018-06-28 PROCEDURE — 20610 DRAIN/INJ JOINT/BURSA W/O US: CPT | Mod: S$PBB,LT,, | Performed by: PHYSICIAN ASSISTANT

## 2018-06-28 PROCEDURE — 80048 BASIC METABOLIC PNL TOTAL CA: CPT | Mod: PO

## 2018-06-28 PROCEDURE — 99213 OFFICE O/P EST LOW 20 MIN: CPT | Mod: PBBFAC,25,PO | Performed by: PHYSICIAN ASSISTANT

## 2018-06-28 PROCEDURE — 99214 OFFICE O/P EST MOD 30 MIN: CPT | Mod: 25,S$PBB,, | Performed by: PHYSICIAN ASSISTANT

## 2018-06-28 PROCEDURE — 20610 DRAIN/INJ JOINT/BURSA W/O US: CPT | Mod: PBBFAC,PO | Performed by: PHYSICIAN ASSISTANT

## 2018-06-28 RX ORDER — FLUTICASONE PROPIONATE AND SALMETEROL 250; 50 UG/1; UG/1
POWDER RESPIRATORY (INHALATION)
COMMUNITY

## 2018-06-28 RX ORDER — TRIAMCINOLONE ACETONIDE 40 MG/ML
40 INJECTION, SUSPENSION INTRA-ARTICULAR; INTRAMUSCULAR
Status: COMPLETED | OUTPATIENT
Start: 2018-06-28 | End: 2018-06-28

## 2018-06-28 RX ADMIN — TRIAMCINOLONE ACETONIDE 40 MG: 40 INJECTION, SUSPENSION INTRA-ARTICULAR; INTRAMUSCULAR at 10:06

## 2018-06-28 NOTE — PATIENT INSTRUCTIONS
Injecting left knee today, ice tonight    Bones are stronger, will stop treatment for now repeat bone scan in 2 year     Daily vitamin D3, continue

## 2018-09-24 ENCOUNTER — TELEPHONE (OUTPATIENT)
Dept: RHEUMATOLOGY | Facility: CLINIC | Age: 83
End: 2018-09-24

## 2018-09-24 NOTE — TELEPHONE ENCOUNTER
----- Message from Gerald Melendez sent at 9/24/2018  4:33 PM CDT -----  Contact: pt   Pt having some hip issues, and would like to speak with nurse about issue.               ...[phone] (home)

## 2018-09-26 ENCOUNTER — TELEPHONE (OUTPATIENT)
Dept: RHEUMATOLOGY | Facility: CLINIC | Age: 83
End: 2018-09-26

## 2018-09-26 ENCOUNTER — OFFICE VISIT (OUTPATIENT)
Dept: RHEUMATOLOGY | Facility: CLINIC | Age: 83
End: 2018-09-26
Payer: MEDICARE

## 2018-09-26 VITALS
SYSTOLIC BLOOD PRESSURE: 163 MMHG | WEIGHT: 226.88 LBS | BODY MASS INDEX: 35.61 KG/M2 | DIASTOLIC BLOOD PRESSURE: 73 MMHG | HEART RATE: 105 BPM | HEIGHT: 67 IN

## 2018-09-26 DIAGNOSIS — M54.9 BACK PAIN, UNSPECIFIED BACK LOCATION, UNSPECIFIED BACK PAIN LATERALITY, UNSPECIFIED CHRONICITY: Primary | ICD-10-CM

## 2018-09-26 PROCEDURE — 99999 PR PBB SHADOW E&M-EST. PATIENT-LVL III: CPT | Mod: PBBFAC,,, | Performed by: INTERNAL MEDICINE

## 2018-09-26 PROCEDURE — 99214 OFFICE O/P EST MOD 30 MIN: CPT | Mod: S$PBB,,, | Performed by: INTERNAL MEDICINE

## 2018-09-26 PROCEDURE — 99213 OFFICE O/P EST LOW 20 MIN: CPT | Mod: PBBFAC,PO | Performed by: INTERNAL MEDICINE

## 2018-09-26 RX ORDER — MOMETASONE FUROATE 50 UG/1
SPRAY, METERED NASAL
COMMUNITY

## 2018-09-26 RX ORDER — METHOCARBAMOL 500 MG/1
TABLET, FILM COATED ORAL
COMMUNITY

## 2018-09-26 RX ORDER — LEVOCETIRIZINE DIHYDROCHLORIDE 5 MG/1
TABLET, FILM COATED ORAL
COMMUNITY

## 2018-09-26 NOTE — PROGRESS NOTES
Subjective:       Patient ID: Shweta Welch is a 83 y.o. female.    Chief Complaint: osteopenia, OA    Patient comes for follow-up for history of having osteopenia and osteoarthritis.    Today  Last seen in June 2018.  Main complaint consistent with osteoarthritis left knee, corticosteroid injection provided.  Was recommended to continue with by bisphosphonate holiday.  Come sooner than scheduled due to worsening lower back and right-sided hip pain.  Main complaint is chronic lower back pain radiation to the side.  The pain is dull, constant, aggravated by prolonged standing and activity.  Release somewhat by rest and pain medication.  Denies any association with focal weakness, paresthesias,  or GI complaints.  Denies any other arthralgias, falls, or trauma.      Osteoarthritis   Associated symptoms include neck pain. Pertinent negatives include no abdominal pain, arthralgias, chest pain, chills, coughing, fatigue, fever, headaches, joint swelling, myalgias, nausea, numbness, rash, sore throat, vomiting or weakness.   Hip Pain   Associated symptoms include neck pain. Pertinent negatives include no abdominal pain, arthralgias, chest pain, chills, coughing, fatigue, fever, headaches, joint swelling, myalgias, nausea, numbness, rash, sore throat, vomiting or weakness.     Review of Systems   Constitutional: Negative for chills, fatigue and fever.   HENT: Negative for mouth sores, rhinorrhea and sore throat.    Eyes: Negative for pain and redness.   Respiratory: Negative for cough and shortness of breath.    Cardiovascular: Negative for chest pain.        Heart failure  htn   Gastrointestinal: Negative for abdominal pain, constipation, diarrhea, nausea and vomiting.   Genitourinary: Negative for dysuria and hematuria.   Musculoskeletal: Positive for back pain, gait problem and neck pain. Negative for arthralgias, joint swelling and myalgias.   Skin: Negative for rash.   Neurological: Negative for weakness, numbness  "and headaches.   Psychiatric/Behavioral: The patient is not nervous/anxious.          Objective:   BP (!) 163/73   Pulse 105   Ht 5' 7" (1.702 m)   Wt 102.9 kg (226 lb 13.7 oz)   BMI 35.53 kg/m²      Physical Exam   Constitutional: She is oriented to person, place, and time and well-developed, well-nourished, and in no distress.   Obese.   HENT:   Head: Normocephalic and atraumatic.   Eyes: Pupils are equal, round, and reactive to light. Right eye exhibits no discharge.   Neck: Normal range of motion.   Cardiovascular: Normal rate.    Pulmonary/Chest: Effort normal. No respiratory distress.   Abdominal: She exhibits no distension.   Neurological: She is alert and oriented to person, place, and time.   Antalgic gait.   Skin: No rash noted. No erythema.     Psychiatric: Mood normal.   Musculoskeletal: Normal range of motion. She exhibits tenderness (Slight tenderness to palpation of trochanteric bursa bilaterally.). She exhibits no edema or deformity.   OA changes to stacy hands, mild, no synovitis    Left knee with crepitus, significant, no effusion, no warmth, decreased rom.    Right knee replaced. No effusion, no warmth    Lumbar paraspinal on the right +ttp  Cervical paraspinals +ttp, right side           No results found for this or any previous visit (from the past 168 hour(s)).       Dexa 5.2016: DF -0.8, left neck -2.4, spine -1.5, frax major 34.2, hip 11.7 decreasing bmd at hip  Dexa 6.28.18: LN -1.7, spine 0.6, improved bone density, frax major 9%, hip 2.1%  Assessment:       1. Back pain, unspecified back location, unspecified back pain laterality, unspecified chronicity        Impression:  Osteopenia with high fracture risk: Reclast  x2 doses last done 6/2017, dexa today with improved density, takes vit D3 daily 1,000,    Bilateral knee osteoarthritis, exacerbation of left knee djd today: right knee replaced; severe left knee djd avoids nsaids, ultimately needs knee replacement has had steroid and " viscous injections in the past    Lumbar spondylosis: with radiation into her right hip and leg today and occ numbness/tingling, [facility], stopped neurontin due to SE    CKD: GFR 34, would not continue bisphosphonate  Plan:       Continue with by phosphatase holiday from for her bone density as she has had sig improvement  Repeat dexa in 2 years 6/2020, if treatment needed would move to [place] not bisphosphonate due to ckd  Follow up w Dr. Arias for back issues and neck issues,  PT for trochanteric bursitis and lower back mechanical pain  Robaxin p.r.n.    rtc in 4 months    Richard Allred M.D.  [facility]   [address]  [place] [address]  Phone: [phone]  Fax: [phone]

## 2018-09-26 NOTE — PATIENT INSTRUCTIONS
Methocarbamol tablets  What is this medicine?  METHOCARBAMOL (meth oh EVA ba mole) helps to relieve pain and stiffness in muscles caused by strains, sprains, or other injury to your muscles.  How should I use this medicine?  Take this medicine by mouth with a full glass of water. Follow the directions on the prescription label. Take your medicine at regular intervals. Do not take your medicine more often than directed.  Talk to your pediatrician regarding the use of this medicine in children. Special care may be needed.  What side effects may I notice from receiving this medicine?  Side effects that you should report to your doctor or health care professional as soon as possible:  · allergic reactions like skin rash, itching or hives, swelling of the face, lips, or tongue  · breathing problems  · confusion  · seizures  · unusually weak or tired  Side effects that usually do not require medical attention (report to your doctor or health care professional if they continue or are bothersome):  · dizziness  · headache  · metallic taste  · tiredness  · upset stomach  What may interact with this medicine?  Do not take this medication with any of the following medicines:  · narcotic medicines for cough  This medicine may also interact with the following medications:  · alcohol  · antihistamines for allergy, cough and cold  · certain medicines for anxiety or sleep  · certain medicines for depression like amitriptyline, fluoxetine, sertraline  · certain medicines for seizures like phenobarbital, primidone  · cholinesterase inhibitors like neostigmine, ambenonium, and pyridostigmine bromide  · general anesthetics like halothane, isoflurane, methoxyflurane, propofol  · local anesthetics like lidocaine, pramoxine, tetracaine  · medicines that relax muscles for surgery  · narcotic medicines for pain  · phenothiazines like chlorpromazine, mesoridazine, prochlorperazine, thioridazine  What if I miss a dose?  If you miss a dose,  take it as soon as you can. If it is almost time for your next dose, take only the next dose. Do not take double or extra doses.  Where should I keep my medicine?  Keep out of the reach of children.  Store at room temperature between 20 and 25 degrees C (68 and 77 degrees F). Keep container tightly closed. Throw away any unused medicine after the expiration date.  What should I tell my health care provider before I take this medicine?  They need to know if you have any of these conditions:  · kidney disease  · seizures  · an unusual or allergic reaction to methocarbamol, other medicines, foods, dyes, or preservatives  · pregnant or trying to get pregnant  · breast-feeding  What should I watch for while using this medicine?  Tell your doctor or health care professional if your symptoms do not start to get better or if they get worse.  You may get drowsy or dizzy. Do not drive, use machinery, or do anything that needs mental alertness until you know how this medicine affects you. Do not stand or sit up quickly, especially if you are an older patient. This reduces the risk of dizzy or fainting spells. Alcohol may interfere with the effect of this medicine. Avoid alcoholic drinks.  If you are taking another medicine that also causes drowsiness, you may have more side effects. Give your health care provider a list of all medicines you use. Your doctor will tell you how much medicine to take. Do not take more medicine than directed. Call emergency for help if you have problems breathing or unusual sleepiness.  NOTE:This sheet is a summary. It may not cover all possible information. If you have questions about this medicine, talk to your doctor, pharmacist, or health care provider. Copyright© 2017 [employer]

## 2018-09-26 NOTE — TELEPHONE ENCOUNTER
----- Message from Andrea Castaneda sent at 9/26/2018 12:18 PM CDT -----  Pt is requesting a call from nurse to discuss concern regarding physical therapy.          Please call pt back at [phone]

## 2019-01-02 NOTE — PROGRESS NOTES
Subjective:       Patient ID: Shweta Welch is a 83 y.o. female.    Chief Complaint: osteopenia, OA    Shweta is a pleasant 82 y/o here for follow up.  She has osteopenia with high fracture risk.  She is on reclast holiday (had 2 doses). Last dexa showing improved bone density. Taking vit D daily 1000units.  No falls/fxs  Of note she does have chronic kidney disease with her gfr at 34, and we're considering moving to [facility] if needed in future.      She also has severe degenerative joint disease in her left knee. Has had steroid and viscous injections in her knee in the past.  She has had her right knee replaced for DJD.  She has neuropathy in her hands and feet.  She used gabapentin but this caused side effects.  She has sig lumbar degenerative disease and [facility] has had injections in the past. She takes tramadol once or twice a day only when needed.   She also has had issues in the past with left shoulder rtc, never had surgery.  Her main complaint is back pain today 8/10.  Also c/o left shoulder and left knee pain.  She was recently in PT and doing ok but her internist found leukocytosis on her recent bloodwork and that work up is taking precedence over everything else right now, worried it may be leukemia. She's here to check in and get a tramadol refill.           Osteoarthritis   Associated symptoms include arthralgias, neck pain and numbness. Pertinent negatives include no abdominal pain, chest pain, chills, coughing, fatigue, fever, headaches, joint swelling, myalgias, nausea, rash, sore throat, vomiting or weakness.     Review of Systems   Constitutional: Negative for chills, fatigue and fever.   HENT: Negative for mouth sores, rhinorrhea and sore throat.    Eyes: Negative for pain and redness.   Respiratory: Negative for cough and shortness of breath.    Cardiovascular: Negative for chest pain.        Heart failure  htn   Gastrointestinal: Negative for abdominal pain,  "constipation, diarrhea, nausea and vomiting.   Genitourinary: Negative for dysuria and hematuria.   Musculoskeletal: Positive for arthralgias, back pain, gait problem and neck pain. Negative for joint swelling and myalgias.   Skin: Negative for rash.   Neurological: Positive for numbness. Negative for weakness and headaches.   Hematological:        Leukocytosis   Psychiatric/Behavioral: The patient is not nervous/anxious.          Objective:   /71   Pulse 100   Ht 5' 7" (1.702 m)   Wt 104.8 kg (231 lb 0.7 oz)   BMI 36.19 kg/m²      Physical Exam   Constitutional: She is oriented to person, place, and time and well-developed, well-nourished, and in no distress.   HENT:   Head: Normocephalic and atraumatic.   Eyes: Pupils are equal, round, and reactive to light. Right eye exhibits no discharge.   Neck: Normal range of motion.   Cardiovascular: Normal rate, regular rhythm and normal heart sounds.  Exam reveals no friction rub.    Pulmonary/Chest: Effort normal and breath sounds normal. No respiratory distress.   Abdominal: Soft. She exhibits no distension. There is no tenderness.   Lymphadenopathy:     She has no cervical adenopathy.   Neurological: She is alert and oriented to person, place, and time.   Skin: No rash noted. No erythema.     Psychiatric: Mood normal.   Musculoskeletal: Normal range of motion. She exhibits no edema or deformity.   OA changes to stacy hands, mild, no synovitis    Left knee with crepitus, significant, no effusion, no warmth, decreased rom.    Right knee replaced. No effusion, no warmth    Lumbar paraspinal on the right +ttp  Cervical paraspinals +ttp, right side                 No results found for this or any previous visit (from the past 168 hour(s)).       Dexa 5.2016: DF -0.8, left neck -2.4, spine -1.5, frax major 34.2, hip 11.7 decreasing bmd at hip  Dexa 6.28.18: LN -1.7, spine 0.6, improved bone density, frax major 9%, hip 2.1%  Assessment:       1. Osteopenia of multiple " sites    2. Spondylosis of lumbar region without myelopathy or radiculopathy    3. Stage 3 chronic kidney disease    4. Primary osteoarthritis of left knee        Impression:  Osteopenia with high fracture risk: Reclast  x2 doses last done 6/2017 now on holiday, dexa with improved density, takes vit D3 daily 1,000    Bilateral knee osteoarthritis, right knee replaced; severe left knee djd avoids nsaids, ultimately needs knee replacement has had steroid and viscous injections in the past    Lumbar spondylosis: with radiation into her right hip and leg today and occ numbness/tingling, [facility], stopped neurontin due to SE--major issue, PT did help    CKD: GFR 34, would not continue bisphosphonate in future    Leukocytosis: being worked up now for leukemia with pcp  Plan:       With her heme issue right now she is taking a pause from PT, this is fine, get back in whenever possible as she did feel better doing this  Refilled tramadol today, she takes 1-2 as needed, rec taking with tylenol to help work better  Cont drug holiday for osteopenia  Repeat dexa in 2 years 6/2020, if treatment needed would move to [place] due to ckd  Follow up w Dr. Arias for back issues and neck issues,    rtc prn if need injections in knee or shoulder, we will see back next year and repeat dexa in 2020

## 2019-01-04 ENCOUNTER — OFFICE VISIT (OUTPATIENT)
Dept: RHEUMATOLOGY | Facility: CLINIC | Age: 84
End: 2019-01-04
Payer: MEDICARE

## 2019-01-04 VITALS
HEART RATE: 100 BPM | WEIGHT: 231.06 LBS | SYSTOLIC BLOOD PRESSURE: 138 MMHG | DIASTOLIC BLOOD PRESSURE: 71 MMHG | HEIGHT: 67 IN | BODY MASS INDEX: 36.27 KG/M2

## 2019-01-04 DIAGNOSIS — M85.89 OSTEOPENIA OF MULTIPLE SITES: Primary | ICD-10-CM

## 2019-01-04 DIAGNOSIS — N18.30 STAGE 3 CHRONIC KIDNEY DISEASE: ICD-10-CM

## 2019-01-04 DIAGNOSIS — M17.12 PRIMARY OSTEOARTHRITIS OF LEFT KNEE: ICD-10-CM

## 2019-01-04 DIAGNOSIS — M47.816 SPONDYLOSIS OF LUMBAR REGION WITHOUT MYELOPATHY OR RADICULOPATHY: ICD-10-CM

## 2019-01-04 PROCEDURE — 99999 PR PBB SHADOW E&M-EST. PATIENT-LVL V: ICD-10-PCS | Mod: PBBFAC,,, | Performed by: PHYSICIAN ASSISTANT

## 2019-01-04 PROCEDURE — 99214 PR OFFICE/OUTPT VISIT, EST, LEVL IV, 30-39 MIN: ICD-10-PCS | Mod: S$PBB,,, | Performed by: PHYSICIAN ASSISTANT

## 2019-01-04 PROCEDURE — 99999 PR PBB SHADOW E&M-EST. PATIENT-LVL V: CPT | Mod: PBBFAC,,, | Performed by: PHYSICIAN ASSISTANT

## 2019-01-04 PROCEDURE — 99215 OFFICE O/P EST HI 40 MIN: CPT | Mod: PBBFAC,PO | Performed by: PHYSICIAN ASSISTANT

## 2019-01-04 PROCEDURE — 99214 OFFICE O/P EST MOD 30 MIN: CPT | Mod: S$PBB,,, | Performed by: PHYSICIAN ASSISTANT

## 2019-01-04 RX ORDER — GABAPENTIN 100 MG/1
100 CAPSULE ORAL 3 TIMES DAILY
COMMUNITY

## 2019-01-04 RX ORDER — TRAMADOL HYDROCHLORIDE 50 MG/1
50 TABLET ORAL EVERY 4 HOURS PRN
Qty: 90 TABLET | Refills: 0 | Status: SHIPPED | OUTPATIENT
Start: 2019-01-04 | End: 2019-12-23 | Stop reason: SDUPTHER

## 2019-01-31 ENCOUNTER — TELEPHONE (OUTPATIENT)
Dept: RHEUMATOLOGY | Facility: CLINIC | Age: 84
End: 2019-01-31

## 2019-01-31 NOTE — TELEPHONE ENCOUNTER
Returned pt call pt wanting to know the name of the infusions she was getting I told her it was reclast pt verbalized understanding.

## 2019-01-31 NOTE — TELEPHONE ENCOUNTER
----- Message from Lili Becker sent at 1/31/2019  2:37 PM CST -----  Contact: Patient   Patient needs to get a list of her medications, injections and infusion that Natacha has been giving her, Please call her at [phone] she stated that she needs it for another Doctor's appointment.    Thanks  Td

## 2019-10-22 ENCOUNTER — TELEPHONE (OUTPATIENT)
Dept: RHEUMATOLOGY | Facility: CLINIC | Age: 84
End: 2019-10-22

## 2019-10-22 NOTE — TELEPHONE ENCOUNTER
----- Message from India Centeno sent at 10/22/2019  4:31 PM CDT -----  Contact: Pt  Pt is requesting call back in regards to having knee pain.          Pls call back at [phone]

## 2019-10-22 NOTE — TELEPHONE ENCOUNTER
Spoke with Mrs. Welch she reports that she is experiencing increase pain to left knee. Appointment scheduled for tomorrow with Natacha Baumann PA-C 10/23/2019 @ 9:00am

## 2019-10-23 ENCOUNTER — OFFICE VISIT (OUTPATIENT)
Dept: RHEUMATOLOGY | Facility: CLINIC | Age: 84
End: 2019-10-23
Payer: MEDICARE

## 2019-10-23 VITALS
TEMPERATURE: 99 F | OXYGEN SATURATION: 99 % | BODY MASS INDEX: 34.73 KG/M2 | DIASTOLIC BLOOD PRESSURE: 64 MMHG | HEART RATE: 110 BPM | WEIGHT: 221.31 LBS | SYSTOLIC BLOOD PRESSURE: 122 MMHG | HEIGHT: 67 IN

## 2019-10-23 DIAGNOSIS — M17.12 PRIMARY OSTEOARTHRITIS OF LEFT KNEE: ICD-10-CM

## 2019-10-23 DIAGNOSIS — M85.89 OSTEOPENIA OF MULTIPLE SITES: ICD-10-CM

## 2019-10-23 DIAGNOSIS — N18.30 STAGE 3 CHRONIC KIDNEY DISEASE: ICD-10-CM

## 2019-10-23 DIAGNOSIS — C95.91 LEUKEMIA IN REMISSION, UNSPECIFIED LEUKEMIA TYPE: ICD-10-CM

## 2019-10-23 DIAGNOSIS — M25.511 CHRONIC RIGHT SHOULDER PAIN: ICD-10-CM

## 2019-10-23 DIAGNOSIS — G89.29 CHRONIC RIGHT SHOULDER PAIN: ICD-10-CM

## 2019-10-23 DIAGNOSIS — M47.816 SPONDYLOSIS OF LUMBAR REGION WITHOUT MYELOPATHY OR RADICULOPATHY: Primary | ICD-10-CM

## 2019-10-23 PROCEDURE — 20610 DRAIN/INJ JOINT/BURSA W/O US: CPT | Mod: PBBFAC | Performed by: PHYSICIAN ASSISTANT

## 2019-10-23 PROCEDURE — 99999 PR PBB SHADOW E&M-EST. PATIENT-LVL V: ICD-10-PCS | Mod: PBBFAC,,, | Performed by: PHYSICIAN ASSISTANT

## 2019-10-23 PROCEDURE — 20610 PR DRAIN/INJECT LARGE JOINT/BURSA: ICD-10-PCS | Mod: S$PBB,LT,, | Performed by: PHYSICIAN ASSISTANT

## 2019-10-23 PROCEDURE — 99214 OFFICE O/P EST MOD 30 MIN: CPT | Mod: 25,S$PBB,, | Performed by: PHYSICIAN ASSISTANT

## 2019-10-23 PROCEDURE — 99215 OFFICE O/P EST HI 40 MIN: CPT | Mod: PBBFAC | Performed by: PHYSICIAN ASSISTANT

## 2019-10-23 PROCEDURE — 99214 PR OFFICE/OUTPT VISIT, EST, LEVL IV, 30-39 MIN: ICD-10-PCS | Mod: 25,S$PBB,, | Performed by: PHYSICIAN ASSISTANT

## 2019-10-23 PROCEDURE — 99999 PR PBB SHADOW E&M-EST. PATIENT-LVL V: CPT | Mod: PBBFAC,,, | Performed by: PHYSICIAN ASSISTANT

## 2019-10-23 PROCEDURE — 20610 DRAIN/INJ JOINT/BURSA W/O US: CPT | Mod: S$PBB,LT,, | Performed by: PHYSICIAN ASSISTANT

## 2019-10-23 RX ORDER — TRIAMCINOLONE ACETONIDE 40 MG/ML
40 INJECTION, SUSPENSION INTRA-ARTICULAR; INTRAMUSCULAR
Status: COMPLETED | OUTPATIENT
Start: 2019-10-23 | End: 2019-10-23

## 2019-10-23 RX ORDER — FLUCONAZOLE 40 MG/ML
200 POWDER, FOR SUSPENSION ORAL
COMMUNITY
Start: 2019-08-15

## 2019-10-23 RX ORDER — ATOVAQUONE 750 MG/5ML
750 SUSPENSION ORAL
COMMUNITY
Start: 2019-10-15

## 2019-10-23 RX ORDER — METHYLPREDNISOLONE 4 MG/1
TABLET ORAL
Qty: 1 PACKAGE | Refills: 0 | Status: SHIPPED | OUTPATIENT
Start: 2019-10-23 | End: 2019-11-13

## 2019-10-23 RX ORDER — ACYCLOVIR 400 MG/1
400 TABLET ORAL
COMMUNITY
Start: 2019-08-15

## 2019-10-23 RX ORDER — IPRATROPIUM BROMIDE 0.5 MG/2.5ML
500 SOLUTION RESPIRATORY (INHALATION)
COMMUNITY
Start: 2019-05-30

## 2019-10-23 RX ORDER — ALLOPURINOL 300 MG/1
300 TABLET ORAL
COMMUNITY
Start: 2019-10-18

## 2019-10-23 RX ORDER — ATORVASTATIN CALCIUM 40 MG/1
40 TABLET, FILM COATED ORAL
COMMUNITY
Start: 2019-08-15

## 2019-10-23 RX ADMIN — TRIAMCINOLONE ACETONIDE 40 MG: 40 INJECTION, SUSPENSION INTRA-ARTICULAR; INTRAMUSCULAR at 10:10

## 2019-10-23 NOTE — PROGRESS NOTES
Subjective:       Patient ID: Shweta Welch is a 84 y.o. female.    Chief Complaint: osteopenia, OA    Shweta is a pleasant 83 y/o here for increased L knee pain due to severe djd and right shoulder pain.      Has had steroid and viscous injections in her knee in the past.  She has had her right knee replaced for DJD.  She has neuropathy in her hands and feet.  She used gabapentin but this caused side effects.  She has sig lumbar degenerative disease and sees [facility] has had injections in the past. She takes tramadol once or twice a day only when needed.   She also has had issues in the past with left shoulder rtc, never had surgery.   Now with increased R shoulder pain and limited rom for a few months, pain with overhead activity.      She also has osteopenia with high fracture risk.  She is on reclast holiday (had 2 doses). Last dexa showing improved bone density. Taking vit D daily 1000units.  No falls/fxs  Of note she does have chronic kidney disease with her gfr at 34, and we're considering moving to [place] if needed in future.               Osteoarthritis   Associated symptoms include arthralgias, neck pain and numbness. Pertinent negatives include no abdominal pain, chest pain, chills, coughing, fatigue, fever, headaches, joint swelling, myalgias, nausea, rash, sore throat, vomiting or weakness.     Review of Systems   Constitutional: Negative for chills, fatigue and fever.   HENT: Negative for mouth sores, rhinorrhea and sore throat.    Eyes: Negative for pain and redness.   Respiratory: Negative for cough and shortness of breath.    Cardiovascular: Negative for chest pain.        Heart failure  htn   Gastrointestinal: Negative for abdominal pain, constipation, diarrhea, nausea and vomiting.   Genitourinary: Negative for dysuria and hematuria.   Musculoskeletal: Positive for arthralgias, back pain, gait problem and neck pain. Negative for joint swelling and myalgias.   Skin: Negative  "for rash.   Neurological: Positive for numbness. Negative for weakness and headaches.   Hematological:        Leukocytosis   Psychiatric/Behavioral: The patient is not nervous/anxious.          Objective:   /64 (BP Location: Left arm, Patient Position: Sitting, BP Method: Medium (Manual))   Pulse 110   Temp 98.8 °F (37.1 °C) (Oral)   Ht 5' 7" (1.702 m)   Wt 100.4 kg (221 lb 5.5 oz)   SpO2 99%   BMI 34.67 kg/m²      Physical Exam   Constitutional: She is oriented to person, place, and time and well-developed, well-nourished, and in no distress.   HENT:   Head: Normocephalic and atraumatic.   Eyes: Pupils are equal, round, and reactive to light. Right eye exhibits no discharge.   Neck: Normal range of motion.   Cardiovascular: Normal rate, regular rhythm and normal heart sounds.  Exam reveals no friction rub.    Pulmonary/Chest: Effort normal and breath sounds normal. No respiratory distress.   Abdominal: Soft. She exhibits no distension. There is no tenderness.   Lymphadenopathy:     She has no cervical adenopathy.   Neurological: She is alert and oriented to person, place, and time.   Skin: No rash noted. No erythema.     Psychiatric: Mood normal.   Musculoskeletal: Normal range of motion. She exhibits no edema or deformity.   OA changes to stacy hands, mild, no synovitis    Left knee with crepitus, significant, no effusion, no warmth, decreased rom.    Right knee replaced. No effusion, no warmth    Lumbar paraspinal on the right +ttp  Cervical paraspinals +ttp, right side                 No results found for this or any previous visit (from the past 168 hour(s)).       Dexa 5.2016: DF -0.8, left neck -2.4, spine -1.5, frax major 34.2, hip 11.7 decreasing bmd at hip  Dexa 6.28.18: LN -1.7, spine 0.6, improved bone density, frax major 9%, hip 2.1%  Assessment:       1. Spondylosis of lumbar region without myelopathy or radiculopathy    2. Primary osteoarthritis of left knee    3. Osteopenia of multiple " sites    4. Stage 3 chronic kidney disease    5. Chronic right shoulder pain    6. Leukemia in remission, unspecified leukemia type        Impression:    L knee djd: severe with increased pain, h/o R knee djd s/p TKA, has had steroid and viscous in the past in L knee    R shoulder pain: suspect RTC issue and djd, sig lack of motion    Osteopenia with high fracture risk: Reclast  x2 doses last done 6/2017 now on holiday, dexa with improved density, takes vit D3 daily 1,000    Lumbar spondylosis: with radiation into her right hip and leg today and occ numbness/tingling, [facility], stopped neurontin due to SE--    CKD: GFR 34, would not continue bisphosphonate in future    Leukemia in remission now: being treated in [place]  Plan:       Inject left knee today as below  Give steroid dose pack start tomorrow to help with her shoulder back and knee pain    Cont drug holiday for osteopenia but due for repeat dexa July 2020, if treatment needed would move to [facility] due to ckd  Follow up w Dr. Arias for back issues and neck issues,  Cont oncology f/u in [place]    rtc prn if need injections in knee or shoulder, we will see back next year and repeat dexa in 2020    Procedure note: After verbal consent was obtained.  The left knee was prepared with sterile prep.  The skin was anesthetized with 1% ethyl chloride.  The knee joint was injected with 3 cc of 1% lidocaine to anesthetize the knee.  The joint was then injected with 40mg kenalog and 1ml of 1% lidocaine.  Hemostasis was obtained.  The patient tolerated procedure well with no complications.  discharge and icing instructions given to patient

## 2019-12-23 RX ORDER — TRAMADOL HYDROCHLORIDE 50 MG/1
50 TABLET ORAL EVERY 4 HOURS PRN
Qty: 90 TABLET | Refills: 0 | Status: SHIPPED | OUTPATIENT
Start: 2019-12-23
